# Patient Record
Sex: FEMALE | Race: BLACK OR AFRICAN AMERICAN | Employment: UNEMPLOYED | URBAN - METROPOLITAN AREA
[De-identification: names, ages, dates, MRNs, and addresses within clinical notes are randomized per-mention and may not be internally consistent; named-entity substitution may affect disease eponyms.]

---

## 2022-08-15 ENCOUNTER — OFFICE VISIT (OUTPATIENT)
Dept: NEUROLOGY | Age: 47
End: 2022-08-15
Payer: MEDICAID

## 2022-08-15 VITALS
RESPIRATION RATE: 13 BRPM | DIASTOLIC BLOOD PRESSURE: 90 MMHG | SYSTOLIC BLOOD PRESSURE: 130 MMHG | WEIGHT: 195 LBS | OXYGEN SATURATION: 99 % | HEART RATE: 76 BPM

## 2022-08-15 DIAGNOSIS — H53.9 VISUAL CHANGES: Primary | ICD-10-CM

## 2022-08-15 PROCEDURE — 99204 OFFICE O/P NEW MOD 45 MIN: CPT | Performed by: SPECIALIST

## 2022-08-15 RX ORDER — MEDROXYPROGESTERONE ACETATE 150 MG/ML
INJECTION, SUSPENSION INTRAMUSCULAR
COMMUNITY
Start: 2022-05-14

## 2022-08-15 RX ORDER — HYDROCHLOROTHIAZIDE 25 MG/1
25 TABLET ORAL DAILY
COMMUNITY
Start: 2022-07-07

## 2022-08-15 RX ORDER — LISINOPRIL 40 MG/1
40 TABLET ORAL DAILY
COMMUNITY
Start: 2022-07-07

## 2022-08-15 RX ORDER — MECLIZINE HYDROCHLORIDE 25 MG/1
TABLET ORAL
COMMUNITY
Start: 2022-07-13

## 2022-08-15 RX ORDER — IBUPROFEN 800 MG
TABLET ORAL DAILY
COMMUNITY
Start: 2022-07-07

## 2022-08-15 RX ORDER — ONDANSETRON 4 MG/1
TABLET, ORALLY DISINTEGRATING ORAL
COMMUNITY
Start: 2022-07-07
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
## 2022-08-15 NOTE — PATIENT INSTRUCTIONS
Patient history viewed patient examined. We will get dedicated brain MRI and depending on results may want to get another close look at the intrinsic eye features with an eye specialist, and see if there is any additional information that might be obtained critical to this case. Further suggestions could obviously follow. Was suggest revisit in about 6 or 7 weeks or when we can anticipate the specialist input.

## 2022-08-15 NOTE — LETTER
8/15/2022    Patient: Jemima Harris   YOB: 1975   Date of Visit: 8/15/2022     Ana Cuevas NP  [address] [place] [address]  Via Fax: [phone]    Dear Ana Cuevas NP,      Thank you for referring Ms. Sherri Rojas to [facility] for evaluation. My notes for this consultation are attached. If you have questions, please do not hesitate to call me. I look forward to following your patient along with you.       Sincerely,    Briseida Anderson MD

## 2022-08-15 NOTE — PROGRESS NOTES
Visual disturbance has been going on for several years  Has seen an ophthalmologist, Dr. Merle Lacy  Has blind spots in the left eye, sees little black spots  Pt does have a sensitive spot when have a straining HA, top of head behind eyes/nasal cavity  Does have Migraine/HA   Also have spinal issues, hip lock up and when it is so will spark the head pains  Has had an episode of vertigo associated with the head pains, vertigo has been happening for several months

## 2022-08-26 ENCOUNTER — HOSPITAL ENCOUNTER (OUTPATIENT)
Dept: MRI IMAGING | Age: 47
Discharge: HOME | End: 2022-08-26
Attending: SPECIALIST
Payer: MEDICAID

## 2022-08-26 DIAGNOSIS — H53.9 VISUAL CHANGES: ICD-10-CM

## 2022-08-26 PROCEDURE — A9576 INJ PROHANCE MULTIPACK: HCPCS | Performed by: SPECIALIST

## 2022-08-26 PROCEDURE — 70553 MRI BRAIN STEM W/O & W/DYE: CPT

## 2022-08-26 PROCEDURE — 74011250636 HC RX REV CODE- 250/636: Performed by: SPECIALIST

## 2022-08-26 RX ADMIN — GADOTERIDOL 18 ML: 279.3 INJECTION, SOLUTION INTRAVENOUS at 09:41

## 2022-08-31 ENCOUNTER — TELEPHONE (OUTPATIENT)
Dept: RHEUMATOLOGY | Age: 47
End: 2022-08-31

## 2022-09-21 DIAGNOSIS — H53.9 VISUAL CHANGES: Primary | ICD-10-CM

## 2022-09-29 ENCOUNTER — TELEPHONE (OUTPATIENT)
Dept: RHEUMATOLOGY | Age: 47
End: 2022-09-29

## 2022-09-29 NOTE — TELEPHONE ENCOUNTER
Pt returned a vm about scheduling NPT appt. Provided Pt on next availability and pt asked for other options. Provided 2 alternative options. Pt did not schedule at our office.